# Patient Record
Sex: FEMALE | Race: WHITE | HISPANIC OR LATINO | Employment: FULL TIME | URBAN - METROPOLITAN AREA
[De-identification: names, ages, dates, MRNs, and addresses within clinical notes are randomized per-mention and may not be internally consistent; named-entity substitution may affect disease eponyms.]

---

## 2017-11-07 ENCOUNTER — HOSPITAL ENCOUNTER (EMERGENCY)
Facility: MEDICAL CENTER | Age: 37
End: 2017-11-07

## 2017-11-07 VITALS
WEIGHT: 156.31 LBS | RESPIRATION RATE: 18 BRPM | SYSTOLIC BLOOD PRESSURE: 120 MMHG | BODY MASS INDEX: 26.04 KG/M2 | TEMPERATURE: 97.8 F | DIASTOLIC BLOOD PRESSURE: 64 MMHG | HEIGHT: 65 IN | HEART RATE: 97 BPM

## 2017-11-07 PROCEDURE — 302449 STATCHG TRIAGE ONLY (STATISTIC)

## 2017-11-07 ASSESSMENT — PAIN SCALES - GENERAL: PAINLEVEL_OUTOF10: 0

## 2017-11-08 NOTE — ED NOTES
"Chief Complaint   Patient presents with   • Weakness     Patient states \"I have to get a blood transfusion every 3 months. They dont know what's wrong with me. I feel cold, tired, and weak and I think I need another transfusion.\"         "

## 2018-02-27 PROBLEM — J45.20 MILD INTERMITTENT ASTHMA WITHOUT COMPLICATION: Status: ACTIVE | Noted: 2018-02-27

## 2018-02-28 PROBLEM — Z87.19 HISTORY OF GI BLEED: Status: ACTIVE | Noted: 2018-02-28

## 2018-02-28 PROBLEM — D64.9 CHRONIC ANEMIA: Status: ACTIVE | Noted: 2018-02-28

## 2018-02-28 PROBLEM — F17.200 CURRENT SMOKER: Status: ACTIVE | Noted: 2018-02-28

## 2018-02-28 PROBLEM — D25.9 UTERINE LEIOMYOMA: Status: ACTIVE | Noted: 2018-02-28

## 2018-02-28 PROBLEM — Z87.19 HISTORY OF GASTRITIS: Status: ACTIVE | Noted: 2018-02-28

## 2018-03-02 PROBLEM — E55.9 VITAMIN D DEFICIENCY: Status: ACTIVE | Noted: 2018-03-02

## 2018-03-05 ENCOUNTER — OFFICE VISIT (OUTPATIENT)
Dept: HEMATOLOGY ONCOLOGY | Facility: MEDICAL CENTER | Age: 38
End: 2018-03-05
Payer: MEDICAID

## 2018-03-05 ENCOUNTER — HOSPITAL ENCOUNTER (OUTPATIENT)
Facility: MEDICAL CENTER | Age: 38
End: 2018-03-05
Attending: INTERNAL MEDICINE
Payer: MEDICAID

## 2018-03-05 VITALS
SYSTOLIC BLOOD PRESSURE: 118 MMHG | RESPIRATION RATE: 16 BRPM | DIASTOLIC BLOOD PRESSURE: 68 MMHG | BODY MASS INDEX: 26.38 KG/M2 | HEART RATE: 77 BPM | TEMPERATURE: 98.3 F | OXYGEN SATURATION: 99 % | WEIGHT: 158.51 LBS

## 2018-03-05 DIAGNOSIS — D64.9 CHRONIC ANEMIA: Primary | ICD-10-CM

## 2018-03-05 DIAGNOSIS — D64.9 CHRONIC ANEMIA: ICD-10-CM

## 2018-03-05 LAB
ANISOCYTOSIS BLD QL SMEAR: NORMAL
BASOPHILS # BLD AUTO: 0 % (ref 0–1.8)
EOSINOPHIL # BLD AUTO: 0.25 K/UL (ref 0–0.51)
EOSINOPHIL NFR BLD: 5 % (ref 0–6.9)
ERYTHROCYTE [DISTWIDTH] IN BLOOD BY AUTOMATED COUNT: 44.7 FL (ref 35.9–50)
FERRITIN SERPL-MCNC: 2 NG/ML (ref 10–291)
FOLATE SERPL-MCNC: 13.3 NG/ML
HCT VFR BLD AUTO: 26.4 % (ref 37–47)
HGB BLD-MCNC: 7.2 G/DL (ref 12–16)
HGB RETIC QN AUTO: 16.3 PG/CELL (ref 29–35)
HYPOCHROMIA BLD QL SMEAR: NORMAL
IMM RETICS NFR: 13.5 % (ref 9.3–17.4)
IRON SATN MFR SERPL: 2 % (ref 15–55)
IRON SERPL-MCNC: 11 UG/DL (ref 40–170)
LYMPHOCYTES # BLD AUTO: 1.45 K/UL (ref 1–4.8)
LYMPHOCYTES NFR BLD: 29 % (ref 22–41)
MANUAL DIFF BLD: NORMAL
MCH RBC QN AUTO: 18 PG (ref 27–33)
MCHC RBC AUTO-ENTMCNC: 27.3 G/DL (ref 33.6–35)
MCV RBC AUTO: 66 FL (ref 81.4–97.8)
MICROCYTES BLD QL SMEAR: NORMAL
MONOCYTES # BLD AUTO: 0.25 K/UL (ref 0–0.85)
MONOCYTES NFR BLD AUTO: 5 % (ref 0–13.4)
NEUTROPHILS # BLD AUTO: 3.05 K/UL (ref 2–7.15)
NEUTROPHILS NFR BLD: 61 % (ref 44–72)
OVALOCYTES BLD QL SMEAR: NORMAL
PLATELET # BLD AUTO: 380 K/UL (ref 164–446)
PLATELET BLD QL SMEAR: ADEQUATE
PMV BLD AUTO: 11 FL (ref 9–12.9)
RBC # BLD AUTO: 4 M/UL (ref 4.2–5.4)
RBC BLD AUTO: NORMAL
RETICS # AUTO: 0.05 M/UL (ref 0.04–0.06)
RETICS/RBC NFR: 1.2 % (ref 0.8–2.1)
SCHISTOCYTES BLD QL SMEAR: NORMAL
TIBC SERPL-MCNC: 540 UG/DL (ref 250–450)
VIT B12 SERPL-MCNC: 378 PG/ML (ref 211–911)
WBC # BLD AUTO: 5 K/UL (ref 4.8–10.8)

## 2018-03-05 PROCEDURE — 82746 ASSAY OF FOLIC ACID SERUM: CPT

## 2018-03-05 PROCEDURE — 85027 COMPLETE CBC AUTOMATED: CPT

## 2018-03-05 PROCEDURE — 99205 OFFICE O/P NEW HI 60 MIN: CPT | Performed by: INTERNAL MEDICINE

## 2018-03-05 PROCEDURE — 85046 RETICYTE/HGB CONCENTRATE: CPT

## 2018-03-05 PROCEDURE — 83550 IRON BINDING TEST: CPT

## 2018-03-05 PROCEDURE — 83540 ASSAY OF IRON: CPT

## 2018-03-05 PROCEDURE — 36415 COLL VENOUS BLD VENIPUNCTURE: CPT

## 2018-03-05 PROCEDURE — 82728 ASSAY OF FERRITIN: CPT

## 2018-03-05 PROCEDURE — 82607 VITAMIN B-12: CPT

## 2018-03-05 PROCEDURE — 85007 BL SMEAR W/DIFF WBC COUNT: CPT

## 2018-03-05 ASSESSMENT — PAIN SCALES - GENERAL: PAINLEVEL: NO PAIN

## 2018-03-05 NOTE — PROGRESS NOTES
Consult Note: Hematology    Date of consultation: 3/5/2018     Referring provider: Promise Allison P.A.-C.    Reason for consultation:   CC: Iron deficiency anemia    History of presenting illness:   -March 24, 2014 normal colonoscopy  -March 21, 2014 normal EGD  -November 10, 2017.CT scan of the abdomen and pelvis shows bulky uterine enlargement with large uterine leiomyomata, measuring about 12.5 x 8.8 cm at about 11 cm transversely  -First seen in on 3/5/2018  Maye Mott  is a 37 y.o. year old female who is seen in clinic today for evaluation of iron deficient anemia, she is accompanied by her wife and mother who provided no other history. Patient states she has had anemia for at least 20 years but has never received IV iron. She has been on oral iron supplement which she is not tolerating well. She has needed multiple blood transfusions throughout her life as she did not have insurance and she would present to the emergency room with symptomatic anemia and repeat receive a blood transfusion.  Currently patient states she is not symptomatic she is able to walk and work without restriction.  She has been evaluated by gastroenterology in the past and had endoscopy in 2012 in 2014 both of which were negative for any source of bleeding. Patient states he does have fibroids for which she is being scheduled to be seen by an ObGyn she describes her periods lasting about 7 days and extremely heavy for the first couple of days      Anemia  Location, blood  Severity, severe  Timing, constant  Modifying factors, oral iron  Quality, microcytic  Duration, chronic  Context, discovered on routine labs  Associated signs and symptoms, fatigue    Review of Systems:  Constitutional: No fever, chills, weight loss ,malaise/fatigue.      All other review of systems are negative except what was mentioned above in the HPI.    Past Medical History:    Past Medical History:   Diagnosis Date   • Asthma    • Enlarged heart    •  Head ache    • Patient denies medical problems        Past surgical history:  No past surgical history on file.    Allergies:  Iron    Medications:    Current Outpatient Prescriptions   Medication Sig Dispense Refill   • Cholecalciferol (VITAMIN D3) 36211 units Tab Take 1 Tab by mouth 2X A WEEK. For 3 months. 9 Tab 2   • IRON PO Take  by mouth.     • albuterol 108 (90 Base) MCG/ACT Aero Soln inhalation aerosol Inhale 2 Puffs by mouth every 6 hours as needed for Shortness of Breath. 8.5 g 2     No current facility-administered medications for this visit.        Social History:     Social History     Social History   • Marital status: Single     Spouse name: N/A   • Number of children: N/A   • Years of education: N/A     Occupational History   • Not on file.     Social History Main Topics   • Smoking status: Current Every Day Smoker   • Smokeless tobacco: Never Used   • Alcohol use No      Comment: Former   • Drug use: No   • Sexual activity: Yes     Other Topics Concern   • Not on file     Social History Narrative   • No narrative on file       Family History:     Family History   Problem Relation Age of Onset   • Arthritis Mother    • Stroke Mother    • Other Mother      Brain tumor   • Hypertension Father        Physical Exam:  Vitals:   /68   Pulse 77   Temp 36.8 °C (98.3 °F)   Resp 16   Wt 71.9 kg (158 lb 8.2 oz)   LMP 02/18/2018   SpO2 99%   Breastfeeding? No   BMI 26.38 kg/m²     General: Not in acute distress, alert and oriented x 3  HEENT: No pallor, icterus. Oropharynx clear.   Neck: Supple, no palpable masses.  Lymph nodes: No palpable cervical, supraclavicular, axillary or inguinal lymphadenopathy.    CVS: regular rate and rhythm, no rubs or gallops  RESP: Clear to auscultate bilaterally, no wheezing or crackles.   ABD: Soft, non tender, non distended, positive bowel sounds, no palpable organomegaly  EXT: No edema or cyanosis  CNS: Alert and oriented x3, No focal deficits.  Skin- No  rash      Labs:       Imaging:  November 10, 2017.  CT scan of the abdomen and pelvis shows bulky uterine enlargement with large uterine leiomyomata, measuring about 12.5 x 8.8 cm at about 11 cm transversely    Assessment and Plan:  -Anemia  -Chronic uncontrolled  -Labs reviewed above  -Imaging scan from November 10, 2017 reviewed with the patient  -We will do a workup with serum iron, ferritin B12 and folate  -Arrange for IV iron as she is not tolerating oral iron  -No need for blood transfusion at this time.  -Follow-up after iron transfusion  -Old records summarized above    -Iron deficiency  -Chronic uncontrolled  -Secondary to fibroids. Advised to follow up with ObGyn as soon as possible  -GI workup has been negative in the past        She agreed and verbalized her agreement and understanding with the current plan.  I answered all questions and concerns she has at this time.  Dear  Promise Allison P.A.-C.,  Thank you for allowing me to participate in her care.    All labs and/or imaging studies mentioned in the note above were reviewed with and explained to the patient as a pertain to medical decision making.    Please note that this dictation was created using voice recognition software. I have made every reasonable attempt to correct obvious errors, but I expect that there are errors of grammar and possibly content that I did not discover before finalizing the note.      SIGNATURES:  Evelia Witt    CC:  ZHANNA Garcia Carrie A, P.A.-C. Kira Brooks M.D. Brooks, Kira M.D.

## 2018-03-20 ENCOUNTER — OUTPATIENT INFUSION SERVICES (OUTPATIENT)
Dept: ONCOLOGY | Facility: MEDICAL CENTER | Age: 38
End: 2018-03-20
Attending: INTERNAL MEDICINE
Payer: MEDICAID

## 2018-03-20 ENCOUNTER — TELEPHONE (OUTPATIENT)
Dept: HEMATOLOGY ONCOLOGY | Facility: MEDICAL CENTER | Age: 38
End: 2018-03-20

## 2018-03-20 VITALS
WEIGHT: 161.38 LBS | HEART RATE: 66 BPM | TEMPERATURE: 98.6 F | OXYGEN SATURATION: 100 % | HEIGHT: 65 IN | RESPIRATION RATE: 18 BRPM | BODY MASS INDEX: 26.89 KG/M2 | DIASTOLIC BLOOD PRESSURE: 50 MMHG | SYSTOLIC BLOOD PRESSURE: 115 MMHG

## 2018-03-20 DIAGNOSIS — D64.9 ANEMIA, UNSPECIFIED TYPE: ICD-10-CM

## 2018-03-20 LAB
BASOPHILS # BLD AUTO: 1.4 % (ref 0–1.8)
BASOPHILS # BLD: 0.1 K/UL (ref 0–0.12)
EOSINOPHIL # BLD AUTO: 0.49 K/UL (ref 0–0.51)
EOSINOPHIL NFR BLD: 6.9 % (ref 0–6.9)
ERYTHROCYTE [DISTWIDTH] IN BLOOD BY AUTOMATED COUNT: 46.2 FL (ref 35.9–50)
HCT VFR BLD AUTO: 28.5 % (ref 37–47)
HGB BLD-MCNC: 7.6 G/DL (ref 12–16)
IMM GRANULOCYTES # BLD AUTO: 0.02 K/UL (ref 0–0.11)
IMM GRANULOCYTES NFR BLD AUTO: 0.3 % (ref 0–0.9)
LYMPHOCYTES # BLD AUTO: 1.71 K/UL (ref 1–4.8)
LYMPHOCYTES NFR BLD: 23.9 % (ref 22–41)
MCH RBC QN AUTO: 17.8 PG (ref 27–33)
MCHC RBC AUTO-ENTMCNC: 26.7 G/DL (ref 33.6–35)
MCV RBC AUTO: 66.6 FL (ref 81.4–97.8)
MONOCYTES # BLD AUTO: 0.45 K/UL (ref 0–0.85)
MONOCYTES NFR BLD AUTO: 6.3 % (ref 0–13.4)
NEUTROPHILS # BLD AUTO: 4.38 K/UL (ref 2–7.15)
NEUTROPHILS NFR BLD: 61.2 % (ref 44–72)
NRBC # BLD AUTO: 0 K/UL
NRBC BLD-RTO: 0 /100 WBC
PLATELET # BLD AUTO: 337 K/UL (ref 164–446)
RBC # BLD AUTO: 4.28 M/UL (ref 4.2–5.4)
WBC # BLD AUTO: 7.2 K/UL (ref 4.8–10.8)

## 2018-03-20 PROCEDURE — 96361 HYDRATE IV INFUSION ADD-ON: CPT

## 2018-03-20 PROCEDURE — A9270 NON-COVERED ITEM OR SERVICE: HCPCS | Performed by: INTERNAL MEDICINE

## 2018-03-20 PROCEDURE — 700111 HCHG RX REV CODE 636 W/ 250 OVERRIDE (IP): Performed by: INTERNAL MEDICINE

## 2018-03-20 PROCEDURE — 96372 THER/PROPH/DIAG INJ SC/IM: CPT | Mod: XU

## 2018-03-20 PROCEDURE — 700102 HCHG RX REV CODE 250 W/ 637 OVERRIDE(OP): Performed by: INTERNAL MEDICINE

## 2018-03-20 PROCEDURE — 700105 HCHG RX REV CODE 258: Performed by: NURSE PRACTITIONER

## 2018-03-20 PROCEDURE — 96365 THER/PROPH/DIAG IV INF INIT: CPT

## 2018-03-20 PROCEDURE — 85025 COMPLETE CBC W/AUTO DIFF WBC: CPT

## 2018-03-20 PROCEDURE — 700105 HCHG RX REV CODE 258: Performed by: INTERNAL MEDICINE

## 2018-03-20 RX ORDER — CYANOCOBALAMIN 1000 UG/ML
1000 INJECTION, SOLUTION INTRAMUSCULAR; SUBCUTANEOUS ONCE
Status: COMPLETED | OUTPATIENT
Start: 2018-03-20 | End: 2018-03-20

## 2018-03-20 RX ORDER — SODIUM CHLORIDE 9 MG/ML
1000 INJECTION, SOLUTION INTRAVENOUS ONCE
Status: COMPLETED | OUTPATIENT
Start: 2018-03-20 | End: 2018-03-20

## 2018-03-20 RX ORDER — ACETAMINOPHEN 325 MG/1
650 TABLET ORAL ONCE
Status: COMPLETED | OUTPATIENT
Start: 2018-03-20 | End: 2018-03-20

## 2018-03-20 RX ORDER — DIPHENHYDRAMINE HCL 25 MG
25 TABLET ORAL ONCE
Status: COMPLETED | OUTPATIENT
Start: 2018-03-20 | End: 2018-03-20

## 2018-03-20 RX ADMIN — CYANOCOBALAMIN 1000 MCG: 1000 INJECTION, SOLUTION INTRAMUSCULAR; SUBCUTANEOUS at 15:34

## 2018-03-20 RX ADMIN — SODIUM CHLORIDE 1000 ML: 9 INJECTION, SOLUTION INTRAVENOUS at 13:30

## 2018-03-20 RX ADMIN — IRON DEXTRAN 25 MG: 50 INJECTION INTRAMUSCULAR; INTRAVENOUS at 12:39

## 2018-03-20 RX ADMIN — DIPHENHYDRAMINE HCL 25 MG: 25 TABLET ORAL at 12:17

## 2018-03-20 RX ADMIN — ACETAMINOPHEN 650 MG: 325 TABLET, FILM COATED ORAL at 12:18

## 2018-03-20 ASSESSMENT — PAIN SCALES - GENERAL: PAINLEVEL: NO PAIN

## 2018-03-20 NOTE — TELEPHONE ENCOUNTER
Vickie from Infusion called to inform Dr. Witt the patient did not proceed with her scheduled Iron Dextran today and would like to receive venofer instead. Vickie is asking for an order for Venofer if Dr. Witt approves. I informed her Dr. Witt is in [place] today but I will relay the message.

## 2018-03-20 NOTE — PROGRESS NOTES
Patient presents for Iron Dextran infusion. Reviewed plan of care with patient and her wife, both verbalize understanding. IV started, blood drawn per protocol. Pre-medications and test dose administered.    1310 - Upon completion of test dose patient with chest pain and pre-rigors. Call placed to Yanique VICTORIA. Per Yanique the patient is to get hydration instead of the full dose of Iron Dextran. She will talk to Dr. Witt about how to proceed with iron infusions in the future.    Hydration completed with no new patient complaints. B-12 injection given as ordered. Informed patient that our schedulers will call her tomorrow when we receive the new orders to get her scheduled. Patient verbalizes understanding. IV discontinued, catheter intact, compression dressing to site. Patient released in no acute distress with her wife.

## 2018-03-20 NOTE — PROGRESS NOTES
IV Iron Per Pharmacy Note  Total Dose Iron not given. Patient reacted to test dose, See RN notes.     Patient Lean Body Weight:  57 kg  Reason for Iron Replacement: Iron Deficiency Anemia, inadequate response/intolerant of oral iron    Lab Results   Component Value Date/Time    WBC 7.2 03/20/2018 11:17 AM    RBC 4.28 03/20/2018 11:17 AM    HEMOGLOBIN 7.6 (L) 03/20/2018 11:17 AM    HEMATOCRIT 28.5 (L) 03/20/2018 11:17 AM    MCV 66.6 (L) 03/20/2018 11:17 AM    MCH 17.8 (L) 03/20/2018 11:17 AM    MCHC 26.7 (L) 03/20/2018 11:17 AM    MPV 11.0 03/05/2018 09:38 AM     3/5/18:  Fe = 11  TIBC = 540  Iron Sat = 2%  Ferritin = 2  Vitamin B12 = 378  Folic acid = 13    Assessment/Plan:  1. IV Iron Indicated.   2. Give Iron Dextran 25 mg IV test dose following diphenhydramine/acetaminophen premeds over 30 minutes per protocol.  3. If no reaction (Anaphylaxis, Hypotension/Hypertension, N/V/D, Chest pain/Back Pain, Urticaria/Pruritis) in the next hour, proceed to full dose.   4.  Charlie Melendrez, PharmD

## 2018-04-02 ENCOUNTER — TELEPHONE (OUTPATIENT)
Dept: HEMATOLOGY ONCOLOGY | Facility: MEDICAL CENTER | Age: 38
End: 2018-04-02

## 2018-04-02 ENCOUNTER — OUTPATIENT INFUSION SERVICES (OUTPATIENT)
Dept: ONCOLOGY | Facility: MEDICAL CENTER | Age: 38
End: 2018-04-02
Attending: INTERNAL MEDICINE
Payer: MEDICAID

## 2018-04-02 VITALS
HEART RATE: 75 BPM | BODY MASS INDEX: 26.23 KG/M2 | TEMPERATURE: 97.5 F | RESPIRATION RATE: 18 BRPM | DIASTOLIC BLOOD PRESSURE: 38 MMHG | OXYGEN SATURATION: 100 % | WEIGHT: 157.41 LBS | SYSTOLIC BLOOD PRESSURE: 115 MMHG | HEIGHT: 65 IN

## 2018-04-02 DIAGNOSIS — D64.9 CHRONIC ANEMIA: ICD-10-CM

## 2018-04-02 LAB
ABO GROUP BLD: NORMAL
ALBUMIN SERPL BCP-MCNC: 4.5 G/DL (ref 3.2–4.9)
ALBUMIN/GLOB SERPL: 1.9 G/DL
ALP SERPL-CCNC: 60 U/L (ref 30–99)
ALT SERPL-CCNC: 11 U/L (ref 2–50)
ANION GAP SERPL CALC-SCNC: 4 MMOL/L (ref 0–11.9)
ANISOCYTOSIS BLD QL SMEAR: ABNORMAL
APPEARANCE UR: ABNORMAL
AST SERPL-CCNC: 14 U/L (ref 12–45)
BACTERIA #/AREA URNS HPF: ABNORMAL /HPF
BASOPHILS # BLD AUTO: 1.8 % (ref 0–1.8)
BASOPHILS # BLD: 0.14 K/UL (ref 0–0.12)
BILIRUB SERPL-MCNC: 0.4 MG/DL (ref 0.1–1.5)
BILIRUB UR QL STRIP.AUTO: NEGATIVE
BLD GP AB INVEST PLASRBC-IMP: NORMAL
BLD GP AB SCN SERPL QL: NORMAL
BUN SERPL-MCNC: 11 MG/DL (ref 8–22)
CALCIUM SERPL-MCNC: 9.2 MG/DL (ref 8.5–10.5)
CHLORIDE SERPL-SCNC: 106 MMOL/L (ref 96–112)
CO2 SERPL-SCNC: 29 MMOL/L (ref 20–33)
COLOR UR: ABNORMAL
COMMENT 1642: NORMAL
CREAT SERPL-MCNC: 0.75 MG/DL (ref 0.5–1.4)
DACRYOCYTES BLD QL SMEAR: NORMAL
DAT C3D-SP REAG RBC QL: NORMAL
DAT IGG-SP REAG RBC QL: NORMAL
EOSINOPHIL # BLD AUTO: 0.46 K/UL (ref 0–0.51)
EOSINOPHIL NFR BLD: 6.1 % (ref 0–6.9)
EPI CELLS #/AREA URNS HPF: ABNORMAL /HPF
ERYTHROCYTE [DISTWIDTH] IN BLOOD BY AUTOMATED COUNT: 55.8 FL (ref 35.9–50)
GLOBULIN SER CALC-MCNC: 2.4 G/DL (ref 1.9–3.5)
GLUCOSE SERPL-MCNC: 69 MG/DL (ref 65–99)
GLUCOSE UR STRIP.AUTO-MCNC: NEGATIVE MG/DL
HCT VFR BLD AUTO: 32.8 % (ref 37–47)
HGB BLD-MCNC: 9.1 G/DL (ref 12–16)
HYALINE CASTS #/AREA URNS LPF: ABNORMAL /LPF
IMM GRANULOCYTES # BLD AUTO: 0.02 K/UL (ref 0–0.11)
IMM GRANULOCYTES NFR BLD AUTO: 0.3 % (ref 0–0.9)
KETONES UR STRIP.AUTO-MCNC: NEGATIVE MG/DL
LEUKOCYTE ESTERASE UR QL STRIP.AUTO: ABNORMAL
LYMPHOCYTES # BLD AUTO: 2.52 K/UL (ref 1–4.8)
LYMPHOCYTES NFR BLD: 33.2 % (ref 22–41)
MCH RBC QN AUTO: 19.5 PG (ref 27–33)
MCHC RBC AUTO-ENTMCNC: 27.7 G/DL (ref 33.6–35)
MCV RBC AUTO: 70.4 FL (ref 81.4–97.8)
MICRO URNS: ABNORMAL
MICROCYTES BLD QL SMEAR: ABNORMAL
MONOCYTES # BLD AUTO: 0.39 K/UL (ref 0–0.85)
MONOCYTES NFR BLD AUTO: 5.1 % (ref 0–13.4)
MORPHOLOGY BLD-IMP: NORMAL
NEUTROPHILS # BLD AUTO: 4.06 K/UL (ref 2–7.15)
NEUTROPHILS NFR BLD: 53.5 % (ref 44–72)
NITRITE UR QL STRIP.AUTO: NEGATIVE
NRBC # BLD AUTO: 0 K/UL
NRBC BLD-RTO: 0 /100 WBC
OVALOCYTES BLD QL SMEAR: NORMAL
PH UR STRIP.AUTO: 7 [PH]
PLATELET # BLD AUTO: 506 K/UL (ref 164–446)
PLATELET BLD QL SMEAR: NORMAL
PMV BLD AUTO: 10.7 FL (ref 9–12.9)
POIKILOCYTOSIS BLD QL SMEAR: NORMAL
POTASSIUM SERPL-SCNC: 3.8 MMOL/L (ref 3.6–5.5)
PROT SERPL-MCNC: 6.9 G/DL (ref 6–8.2)
PROT UR QL STRIP: 30 MG/DL
RBC # BLD AUTO: 4.66 M/UL (ref 4.2–5.4)
RBC # URNS HPF: >150 /HPF
RBC BLD AUTO: PRESENT
RBC UR QL AUTO: ABNORMAL
RH BLD: NORMAL
SODIUM SERPL-SCNC: 139 MMOL/L (ref 135–145)
SP GR UR STRIP.AUTO: 1.01
UROBILINOGEN UR STRIP.AUTO-MCNC: 0.2 MG/DL
WBC # BLD AUTO: 7.6 K/UL (ref 4.8–10.8)
WBC #/AREA URNS HPF: ABNORMAL /HPF

## 2018-04-02 PROCEDURE — 80053 COMPREHEN METABOLIC PANEL: CPT

## 2018-04-02 PROCEDURE — 85025 COMPLETE CBC W/AUTO DIFF WBC: CPT

## 2018-04-02 PROCEDURE — 306780 HCHG STAT FOR TRANSFUSION PER CASE

## 2018-04-02 PROCEDURE — 86901 BLOOD TYPING SEROLOGIC RH(D): CPT

## 2018-04-02 PROCEDURE — 700111 HCHG RX REV CODE 636 W/ 250 OVERRIDE (IP): Performed by: INTERNAL MEDICINE

## 2018-04-02 PROCEDURE — 86900 BLOOD TYPING SEROLOGIC ABO: CPT

## 2018-04-02 PROCEDURE — 86870 RBC ANTIBODY IDENTIFICATION: CPT

## 2018-04-02 PROCEDURE — 81001 URINALYSIS AUTO W/SCOPE: CPT

## 2018-04-02 PROCEDURE — 96374 THER/PROPH/DIAG INJ IV PUSH: CPT

## 2018-04-02 PROCEDURE — 86880 COOMBS TEST DIRECT: CPT | Mod: 91

## 2018-04-02 PROCEDURE — 36415 COLL VENOUS BLD VENIPUNCTURE: CPT

## 2018-04-02 PROCEDURE — 86850 RBC ANTIBODY SCREEN: CPT

## 2018-04-02 RX ADMIN — IRON SUCROSE 200 MG: 20 INJECTION, SOLUTION INTRAVENOUS at 17:44

## 2018-04-02 ASSESSMENT — PAIN SCALES - GENERAL: PAINLEVEL: NO PAIN

## 2018-04-02 NOTE — LETTER
April 2, 2018         Patient: Maye Mott   YOB: 1980   Date of Visit: 4/2/2018           To Whom it May Concern:    Maye Mott was seen in my clinic on 4/2/2018.   If you have any questions or concerns, please don't hesitate to call.        Sincerely,           Alejandra Denise RN  Electronically Signed

## 2018-04-03 NOTE — PROGRESS NOTES
Patient arrived to clinic for #1/5 Venofer.  Patient reported she went to [facility] for a blood transfusion about a week ago.  She states she has had flank pain, blood in urine, and night sweat/chills since the transfusion.  SHAKIR Cahnel notified and CBC, CMP, UA ordered with concern for possible transfusion reaction.   PIV established with good blood return and labs sent.  Reviewed lab results with Yanique.  Per Yanique, transfusion reaction ruled out and concerned for possible kidney stones. She stated to have the patient drink plenty of fluids and if symptoms persist to go see her PCP.  OK given to administer Venofer today.  Informed patient and spouse who verbalized understanding.  Venofer administered IV push over 5 minutes, pt tolerated well.  Monitored for 30 minutes post medication, denied any discomfort.  Confirmed next appointment and pt ambulated out of clinic in no apparent distress.

## 2018-04-03 NOTE — TELEPHONE ENCOUNTER
Infusion RN phone to update regarding patient's symptoms. She is noting pink tinged urine and onset of back pain since transfusion of RBC last week at [facility]: no c/o dysuria, burning, urgency, or frequency. She reports night sweats, possible low-grade fever although she is unable to quantify.    Patient is scheduled to receive Venofer: CBC, CMP, UA completed as well as indirect/direct INDRA given recent transfusion and onset of hematuria and low back pain. CBC and CMP were evaluated and found to be within acceptable limits. UA is noted to have a large amount of occult hematuria, INDRA negative although antibody screen is positive - blood bank will workup accordingly and notified of any concerns.    Patient proceeded with Venofer as scheduled.  She is advised to increase hydration and proceed for further evaluation to PCP for evaluation/consideration of possible kidney stones should her symptoms persist.

## 2018-04-05 ENCOUNTER — OUTPATIENT INFUSION SERVICES (OUTPATIENT)
Dept: ONCOLOGY | Facility: MEDICAL CENTER | Age: 38
End: 2018-04-05
Attending: INTERNAL MEDICINE
Payer: MEDICAID

## 2018-04-05 VITALS
HEIGHT: 65 IN | TEMPERATURE: 97.6 F | WEIGHT: 156.31 LBS | BODY MASS INDEX: 26.04 KG/M2 | DIASTOLIC BLOOD PRESSURE: 45 MMHG | RESPIRATION RATE: 18 BRPM | SYSTOLIC BLOOD PRESSURE: 112 MMHG | OXYGEN SATURATION: 100 % | HEART RATE: 78 BPM

## 2018-04-05 PROCEDURE — 306780 HCHG STAT FOR TRANSFUSION PER CASE

## 2018-04-05 PROCEDURE — 96374 THER/PROPH/DIAG INJ IV PUSH: CPT

## 2018-04-05 PROCEDURE — 700111 HCHG RX REV CODE 636 W/ 250 OVERRIDE (IP): Performed by: INTERNAL MEDICINE

## 2018-04-05 RX ADMIN — IRON SUCROSE 200 MG: 20 INJECTION, SOLUTION INTRAVENOUS at 16:43

## 2018-04-05 ASSESSMENT — PAIN SCALES - GENERAL: PAINLEVEL: NO PAIN

## 2018-04-06 NOTE — PROGRESS NOTES
Patient arrived for Day 2 of 5 Venofer infusion.  Reports no significant concerns with first Venofer infusion.  Pt appears anxious, reports she still feels low back pain with certain positions while working, and still has blood in her urine. She reports this is worse at night.  States she feels sweaty at night, but unsure if fever.  Pt reports attempting to make appt with PCP, has not gotten thru at this time. PIV started, venofer push given over 5 mins.  Pt tolerated well.  PIV flushed for 30mins; post observation completed without incident.  Confirmed next appt on Saturday.  Encouraged patient to continue monitoring for worsening symptoms and f/u with PCP and/or ER if emergent.  Pt agreeable.  Discharged home with wife in no apparent distress.

## 2018-04-07 ENCOUNTER — OUTPATIENT INFUSION SERVICES (OUTPATIENT)
Dept: ONCOLOGY | Facility: MEDICAL CENTER | Age: 38
End: 2018-04-07
Attending: INTERNAL MEDICINE
Payer: MEDICAID

## 2018-04-07 VITALS
BODY MASS INDEX: 25.64 KG/M2 | DIASTOLIC BLOOD PRESSURE: 83 MMHG | RESPIRATION RATE: 18 BRPM | HEART RATE: 72 BPM | SYSTOLIC BLOOD PRESSURE: 97 MMHG | HEIGHT: 65 IN | TEMPERATURE: 98.6 F | OXYGEN SATURATION: 98 % | WEIGHT: 153.88 LBS

## 2018-04-07 PROCEDURE — 306780 HCHG STAT FOR TRANSFUSION PER CASE

## 2018-04-07 ASSESSMENT — PAIN SCALES - GENERAL: PAINLEVEL: NO PAIN

## 2018-04-08 NOTE — PROGRESS NOTES
"Pt arrived for day 3/5 of Venofer for Anemia. Pt told nurse that she developed a rash, and what looked like hives on her left arm yesterday. Pt said that it could have been working with different \"spices\" at work. Pt also reported that she felt SOB after getting her Venofer on Thursday. RN explained to Pt that this could have been a reaction to the Venofer. Pt's doctor paged. RN explained Pt's symptoms to Dr. Witt, and that Pt has a history of being allergic to Iron Dextran. Dr. Witt told nurse that he did not want Pt to receive medication today, and to follow-up with him this next week. RN explained POC to Pt, who was receptive to education. RN also encouraged Pt to follow-up with the ER or urgent care for the blood in her urine that she has been experiencing for the last 2 weeks (her PCP advised that she follow-up with the ED yesterday, but she didn't because she didn't want it to interfere with her Iron infusion). Pt left department appearing in stable condition.  "

## 2018-04-09 ENCOUNTER — APPOINTMENT (OUTPATIENT)
Dept: ONCOLOGY | Facility: MEDICAL CENTER | Age: 38
End: 2018-04-09
Attending: INTERNAL MEDICINE
Payer: MEDICAID

## 2018-04-11 ENCOUNTER — APPOINTMENT (OUTPATIENT)
Dept: ONCOLOGY | Facility: MEDICAL CENTER | Age: 38
End: 2018-04-11
Attending: INTERNAL MEDICINE
Payer: MEDICAID

## 2018-04-12 ENCOUNTER — HOSPITAL ENCOUNTER (OUTPATIENT)
Facility: MEDICAL CENTER | Age: 38
End: 2018-04-12
Attending: INTERNAL MEDICINE
Payer: MEDICAID

## 2018-04-12 ENCOUNTER — OFFICE VISIT (OUTPATIENT)
Dept: HEMATOLOGY ONCOLOGY | Facility: MEDICAL CENTER | Age: 38
End: 2018-04-12
Payer: MEDICAID

## 2018-04-12 ENCOUNTER — NON-PROVIDER VISIT (OUTPATIENT)
Dept: HEMATOLOGY ONCOLOGY | Facility: MEDICAL CENTER | Age: 38
End: 2018-04-12
Payer: MEDICAID

## 2018-04-12 VITALS
OXYGEN SATURATION: 100 % | HEART RATE: 67 BPM | RESPIRATION RATE: 16 BRPM | WEIGHT: 155.09 LBS | DIASTOLIC BLOOD PRESSURE: 74 MMHG | HEIGHT: 66 IN | BODY MASS INDEX: 24.93 KG/M2 | SYSTOLIC BLOOD PRESSURE: 112 MMHG | TEMPERATURE: 98.1 F

## 2018-04-12 DIAGNOSIS — D64.9 CHRONIC ANEMIA: ICD-10-CM

## 2018-04-12 LAB
ERYTHROCYTE [DISTWIDTH] IN BLOOD BY AUTOMATED COUNT: 68 FL (ref 35.9–50)
HCT VFR BLD AUTO: 38.1 % (ref 37–47)
HGB BLD-MCNC: 10.9 G/DL (ref 12–16)
MCH RBC QN AUTO: 21 PG (ref 27–33)
MCHC RBC AUTO-ENTMCNC: 28.6 G/DL (ref 33.6–35)
MCV RBC AUTO: 73.6 FL (ref 81.4–97.8)
PLATELET # BLD AUTO: 405 K/UL (ref 164–446)
RBC # BLD AUTO: 5.18 M/UL (ref 4.2–5.4)
WBC # BLD AUTO: 6.2 K/UL (ref 4.8–10.8)

## 2018-04-12 PROCEDURE — 85027 COMPLETE CBC AUTOMATED: CPT

## 2018-04-12 PROCEDURE — 36415 COLL VENOUS BLD VENIPUNCTURE: CPT | Performed by: INTERNAL MEDICINE

## 2018-04-12 PROCEDURE — 99213 OFFICE O/P EST LOW 20 MIN: CPT | Performed by: INTERNAL MEDICINE

## 2018-04-12 ASSESSMENT — PAIN SCALES - GENERAL: PAINLEVEL: 6=MODERATE PAIN

## 2018-04-12 NOTE — PROGRESS NOTES
Maye Mott is a 37 y.o. female here for a non-provider visit for: Lab Draws  on 4/12/2018 at 10:10 AM    Procedure Performed: Venipuncture     Anatomical site: Right Antecubital Area (AC)    Equipment used: 21g    Labs drawn: CBC w/diff    Ordering Provider: Dr. LUIS E Witt    Navin By: Og Friedman Ass't    Successful draw

## 2018-04-12 NOTE — PROGRESS NOTES
Consult Note: Hematology    Date of consultation: 4/12/2018     Referring provider: Promise Allison P.A.-C.    Reason for consultation:   CC: Iron deficiency anemia    History of presenting illness:   -March 24, 2014 normal colonoscopy  -March 21, 2014 normal EGD  -November 10, 2017.CT scan of the abdomen and pelvis shows bulky uterine enlargement with large uterine leiomyomata, measuring about 12.5 x 8.8 cm at about 11 cm transversely  -March 20, 2018. Patient presented for IV iron dextran. She had chest pain and right-sided with test dose  -April 2, 2018. Patient received 1st dose of Venofer  -April 5, 2018 patient received 2nd dose of Venofer  -April 7, 2018. Patient presented for the 3rd dose of Venofer. She revealed she has some shortness of breath, hives and rash after the 2nd dose after she went home. Venofer discontinued      -First seen in on 3/5/2018  Maye Mott  is a 37 y.o. year old female who is seen in clinic today for evaluation of iron deficient anemia, she is accompanied by her wife and mother who provided no other history. Patient states she has had anemia for at least 20 years but has never received IV iron. She has been on oral iron supplement which she is not tolerating well. She has needed multiple blood transfusions throughout her life as she did not have insurance and she would present to the emergency room with symptomatic anemia and repeat receive a blood transfusion.  Currently patient states she is not symptomatic she is able to walk and work without restriction.  She has been evaluated by gastroenterology in the past and had endoscopy in 2012 in 2014 both of which were negative for any source of bleeding. Patient states he does have fibroids for which she is being scheduled to be seen by an ObGyn she describes her periods lasting about 7 days and extremely heavy for the first couple of days      Interval History:  Maye Mott is a 37 y.o. female who is seen in  clinic today for evaluation of iron deficiency anemia. Patient states she is unable to take oral iron at all. She is going to see ObGyn today for evaluation of her uterine mass. She is scheduled for surgery in June      Anemia  Location, blood  Severity, severe  Timing, constant  Modifying factors, oral iron  Quality, microcytic  Duration, chronic  Context, discovered on routine labs  Associated signs and symptoms, fatigue    Review of Systems:  Constitutional: No fever, chills, weight loss ,malaise/fatigue.      All other review of systems are negative except what was mentioned above in the HPI.    Past Medical History:    Past Medical History:   Diagnosis Date   • Asthma    • Enlarged heart    • Head ache    • Patient denies medical problems        Past surgical history:  History reviewed. No pertinent surgical history.    Allergies:  Iron    Medications:    Current Outpatient Prescriptions   Medication Sig Dispense Refill   • albuterol 108 (90 Base) MCG/ACT Aero Soln inhalation aerosol Inhale 2 Puffs by mouth every 6 hours as needed for Shortness of Breath. 8.5 g 2   • Cholecalciferol (VITAMIN D3) 20863 units Tab Take 1 Tab by mouth 2X A WEEK. For 3 months. 9 Tab 2     No current facility-administered medications for this visit.        Social History:     Social History     Social History   • Marital status: Single     Spouse name: N/A   • Number of children: N/A   • Years of education: N/A     Occupational History   • Not on file.     Social History Main Topics   • Smoking status: Current Every Day Smoker   • Smokeless tobacco: Never Used   • Alcohol use No      Comment: Former   • Drug use: No   • Sexual activity: Yes     Partners: Female     Other Topics Concern   • Not on file     Social History Narrative   • No narrative on file       Family History:     Family History   Problem Relation Age of Onset   • Arthritis Mother    • Stroke Mother    • Other Mother      Brain tumor   • Hypertension Father   "      Physical Exam:  Vitals:   /74   Pulse 67   Temp 36.7 °C (98.1 °F)   Resp 16   Ht 1.676 m (5' 6\")   Wt 70.4 kg (155 lb 1.5 oz)   SpO2 100%   BMI 25.03 kg/m²     General: Not in acute distress, alert and oriented x 3  HEENT: No pallor, icterus. Oropharynx clear.   Neck: Supple, no palpable masses.  Lymph nodes: No palpable cervical, supraclavicular, axillary or inguinal lymphadenopathy.    CVS: regular rate and rhythm, no rubs or gallops  RESP: Clear to auscultate bilaterally, no wheezing or crackles.   ABD: Soft, non tender, non distended, positive bowel sounds, no palpable organomegaly  EXT: No edema or cyanosis  CNS: Alert and oriented x3, No focal deficits.  Skin- No rash      Labs:   Results for TAYLOR HARVEY (MRN [id number]) as of 4/12/2018 11:35   3/20/2018 11:17 4/2/2018 14:35   WBC 7.2 7.6   RBC 4.28 4.66   Hemoglobin 7.6 (L) 9.1 (L)   Hematocrit 28.5 (L) 32.8 (L)   MCV 66.6 (L) 70.4 (L)   MCH 17.8 (L) 19.5 (L)   MCHC 26.7 (L) 27.7 (L)   RDW 46.2 55.8 (H)   Platelet Count 337 506 (H)   MPV  10.7   Neutrophils-Polys 61.20 53.50   Neutrophils (Absolute) 4.38 4.06   Lymphocytes 23.90 33.20   Lymphs (Absolute) 1.71 2.52   Monocytes 6.30 5.10   Monos (Absolute) 0.45 0.39   Eosinophils 6.90 6.10   Eos (Absolute) 0.49 0.46   Basophils 1.40 1.80   Baso (Absolute) 0.10 0.14 (H)   Immature Granulocytes 0.30 0.30   Immature Granulocytes (abs) 0.02 0.02   Nucleated RBC 0.00 0.00   NRBC (Absolute) 0.00 0.00   Plt Estimation  Increased   RBC Morphology  Present   Anisocytosis  2+   Microcytosis  2+   Poikilocytosis  2+   Ovalocytes  2+   Tear Drop Cells  1+   Comments-Diff  see below   Peripheral Smear Review  see below     Imaging:  November 10, 2017.  CT scan of the abdomen and pelvis shows bulky uterine enlargement with large uterine leiomyomata, measuring about 12.5 x 8.8 cm at about 11 cm transversely    Assessment and Plan:  -Anemia  -Chronic uncontrolled   4/12/2018   -Patient received 2 out " of 5 planned doses of Venofer  -Unable to tolerate this dose of iron dextran  -Recommended patient take iron pills every other day to help with side effects  -We'll transfuse as needed  -Check CBC today  -Proceed with surgery as planned  -I discussed with pharmacy possibility of giving the patient Injectafer however since the patient is on Medicaid that will not be covered. Patient is currently looking for a new job and when her insurance changes. Revisit the issue if needed.    -Iron deficiency  -Chronic uncontrolled  -Unable to tolerate IV  -Encouraged patient to take oral iron every other day to help with side effects  -Proceed with surgery as soon as possible    She agreed and verbalized her agreement and understanding with the current plan.  I answered all questions and concerns she has at this time.  Dear  Promise Allison P.A.-C.,  Thank you for allowing me to participate in her care.    All labs and/or imaging studies mentioned in the note above were reviewed with and explained to the patient as a pertain to medical decision making.    Please note that this dictation was created using voice recognition software. I have made every reasonable attempt to correct obvious errors, but I expect that there are errors of grammar and possibly content that I did not discover before finalizing the note.      SIGNATURES:  Evelia Witt    CC:  ZHANNA Garcia Carrie A, P.A.-C. Kira Brooks M.D. Brooks, Kira M.D.

## 2018-05-16 NOTE — H&P
DATE OF SCHEDULED SURGERY:  2018    REASON FOR SURGERY:  Symptomatic uterine fibroids, associated   menometrorrhagia, dysmenorrhea, pelvic pain, and symptomatic anemia.    HISTORY OF PRESENT ILLNESS:  This is a 38-year-old woman,  0 negative   urine pregnancy test on 2018, who states that she has exhausted all   conservative management, has failed multiple hormonal regimens.  She has had   Depo-Lupron therapy with minimal shrinkage of the uterine fibroids and states   that she is now interested in proceeding forward with attempted definitive   surgical correction with the surgery as described above.  She is having   significant pelvic pain and pressure and states that she has exhausted all   conservative measures and is now interested in proceeding forward with   laparoscopic-assisted vaginal hysterectomy, bilateral salpingectomy.  She is   interested in proceeding forward with ovarian conservation after counseling.    Risks, benefits, and alternatives of these procedures were addressed with the   patient.  She has asked appropriate questions, signed the appropriate   consents, and wished to proceed forward with surgery as planned.  Of note, the   patient has had a pelvic ultrasound demonstrating enlarged uterus with   several uterine fibroids, largest of which measures 6.6 cm of the fundal   aspect and a 4.9 cm posterior aspect.  She also has a superior fibroid of 3.9   cm.  She does understand the small possibility of an open procedure, although   this would be less likely.  She states that she has exhausted conservative   measures, has been given Depo-Lupron in an attempt to shrink the uterus and is   now interested in proceeding forward with surgery as described above.  Risks,   benefits and alternatives have been addressed.  She has asked appropriate   questions including the possibility of an open procedure and wishes to proceed   forward with surgery as planned.  She has undergone an  endometrial biopsy,   which showed no evidence of hyperplasia or malignancy.  Her Pap smear was   within normal limits and she had negative cervical cultures.    PAST MEDICAL HISTORY:  Gastroesophageal reflux disease, migraine headaches,   anemia, lumbago, hernia, varicosities, asthma.    PAST SURGICAL HISTORY:  She has had eye surgery previously.    OBSTETRICAL HISTORY:  She is nulligravid.    GYNECOLOGIC HISTORY:  Patient reports completely irregular cycles, which she   describes excessive passage of large clots, incapacitating dysmenorrhea,   pelvic pain.  She does report stress urinary incontinence, although she does   feel this is related to the impingement of the uterine fibroids.  She has been   counseled regarding proceeding forward with management of this and upon   further consideration, has declined to proceed forward with a sling at this   time; however, should she change her mind, require future workup or even   postoperatively failed continued conservative management, we will proceed   forward with possible surgical intervention later today.    SOCIAL HISTORY:  She is .  She denies use of any alcohol.  She reports   a 25-year history of tobacco use.  She denies any other drug use.    MEDICATIONS:  Ibuprofen p.r.n.    ALLERGIES:  No known drug allergies.    PHYSICAL EXAMINATION:  VITAL SIGNS:  She is afebrile, hemodynamically stable.  HEART:  Regular rate and rhythm.  CHEST:  Clear to auscultation bilaterally.  ABDOMEN:  Soft, nondistended, nontender.  Bowel sounds are present.  PELVIC:  Shows an enlarged uterus 10-12 weeks size.  No adnexal mass or   tenderness to palpation were appreciated.  Rectovaginal exam confirmed the   above.  She is guaiac negative.  EXTREMITIES:  Nontender.    ASSESSMENT AND PLAN:  A 38-year-old woman with symptomatic uterine fibroids   with associated menometrorrhagia, dysmenorrhea, pelvic pain, symptomatic   anemia, interested in proceeding forward with surgery as  described above.    Risks, benefits and alternatives have been addressed with the patient in   detail over several visits.  She has asked appropriate questions, signed the   appropriate consents and is wishing to proceed forward with surgery as   planned.       ____________________________________     MD JOLENE FREITAS / ELICIA    DD:  05/16/2018 08:20:28  DT:  05/16/2018 10:04:43    D#:  [id number]  Job#:  [id number]

## 2018-05-29 ENCOUNTER — TELEPHONE (OUTPATIENT)
Dept: HEMATOLOGY ONCOLOGY | Facility: MEDICAL CENTER | Age: 38
End: 2018-05-29

## 2018-05-29 NOTE — TELEPHONE ENCOUNTER
I let a message for patient to return our call. Dues to scheduling conflicts with Yanique's schedule on Friday 6/1/18, patient was rescheduled to be seen at 2:00 pm. Please reschedule patient if this appointment date and time does not work for her.    I also tired patients emergency contact and was unable to reach the emergency contact as well.

## 2018-05-30 NOTE — TELEPHONE ENCOUNTER
I spoke to patient regarding her change in appointment time. Patient asked to be seen later in the day. Patient was scheduled on 6/1/18 at 4:30 pm to see ESME Michelle

## 2018-06-01 ENCOUNTER — HOSPITAL ENCOUNTER (OUTPATIENT)
Facility: MEDICAL CENTER | Age: 38
End: 2018-06-01
Attending: NURSE PRACTITIONER
Payer: MEDICAID

## 2018-06-01 ENCOUNTER — NON-PROVIDER VISIT (OUTPATIENT)
Dept: HEMATOLOGY ONCOLOGY | Facility: MEDICAL CENTER | Age: 38
End: 2018-06-01
Payer: MEDICAID

## 2018-06-01 ENCOUNTER — OFFICE VISIT (OUTPATIENT)
Dept: HEMATOLOGY ONCOLOGY | Facility: MEDICAL CENTER | Age: 38
End: 2018-06-01
Payer: MEDICAID

## 2018-06-01 VITALS
BODY MASS INDEX: 26.82 KG/M2 | HEART RATE: 60 BPM | TEMPERATURE: 97.8 F | WEIGHT: 161 LBS | OXYGEN SATURATION: 98 % | SYSTOLIC BLOOD PRESSURE: 110 MMHG | RESPIRATION RATE: 18 BRPM | DIASTOLIC BLOOD PRESSURE: 60 MMHG | HEIGHT: 65 IN

## 2018-06-01 VITALS
SYSTOLIC BLOOD PRESSURE: 110 MMHG | WEIGHT: 161.27 LBS | OXYGEN SATURATION: 98 % | RESPIRATION RATE: 18 BRPM | HEIGHT: 65 IN | BODY MASS INDEX: 26.87 KG/M2 | TEMPERATURE: 97.8 F | DIASTOLIC BLOOD PRESSURE: 60 MMHG | HEART RATE: 60 BPM

## 2018-06-01 DIAGNOSIS — R30.0 DYSURIA: ICD-10-CM

## 2018-06-01 DIAGNOSIS — D64.9 CHRONIC ANEMIA: ICD-10-CM

## 2018-06-01 DIAGNOSIS — D25.9 UTERINE LEIOMYOMA, UNSPECIFIED LOCATION: ICD-10-CM

## 2018-06-01 DIAGNOSIS — R11.0 NAUSEA: ICD-10-CM

## 2018-06-01 LAB
BASOPHILS # BLD AUTO: 1.1 % (ref 0–1.8)
BASOPHILS # BLD: 0.06 K/UL (ref 0–0.12)
EOSINOPHIL # BLD AUTO: 0.28 K/UL (ref 0–0.51)
EOSINOPHIL NFR BLD: 4.9 % (ref 0–6.9)
ERYTHROCYTE [DISTWIDTH] IN BLOOD BY AUTOMATED COUNT: 58.4 FL (ref 35.9–50)
HCT VFR BLD AUTO: 27.9 % (ref 37–47)
HGB BLD-MCNC: 8.5 G/DL (ref 12–16)
IMM GRANULOCYTES # BLD AUTO: 0.02 K/UL (ref 0–0.11)
IMM GRANULOCYTES NFR BLD AUTO: 0.4 % (ref 0–0.9)
LYMPHOCYTES # BLD AUTO: 1.79 K/UL (ref 1–4.8)
LYMPHOCYTES NFR BLD: 31.6 % (ref 22–41)
MCH RBC QN AUTO: 23.6 PG (ref 27–33)
MCHC RBC AUTO-ENTMCNC: 30.5 G/DL (ref 33.6–35)
MCV RBC AUTO: 77.5 FL (ref 81.4–97.8)
MONOCYTES # BLD AUTO: 0.4 K/UL (ref 0–0.85)
MONOCYTES NFR BLD AUTO: 7.1 % (ref 0–13.4)
NEUTROPHILS # BLD AUTO: 3.11 K/UL (ref 2–7.15)
NEUTROPHILS NFR BLD: 54.9 % (ref 44–72)
NRBC # BLD AUTO: 0 K/UL
NRBC BLD-RTO: 0 /100 WBC
PLATELET # BLD AUTO: 344 K/UL (ref 164–446)
PMV BLD AUTO: 10.3 FL (ref 9–12.9)
RBC # BLD AUTO: 3.6 M/UL (ref 4.2–5.4)
WBC # BLD AUTO: 5.7 K/UL (ref 4.8–10.8)

## 2018-06-01 PROCEDURE — 85025 COMPLETE CBC W/AUTO DIFF WBC: CPT

## 2018-06-01 PROCEDURE — 99214 OFFICE O/P EST MOD 30 MIN: CPT | Performed by: NURSE PRACTITIONER

## 2018-06-01 PROCEDURE — 36415 COLL VENOUS BLD VENIPUNCTURE: CPT | Performed by: NURSE PRACTITIONER

## 2018-06-01 RX ORDER — ONDANSETRON 4 MG/1
4 TABLET, FILM COATED ORAL EVERY 4 HOURS PRN
Qty: 30 TAB | Refills: 0 | Status: SHIPPED | OUTPATIENT
Start: 2018-06-01 | End: 2018-10-02

## 2018-06-01 ASSESSMENT — ENCOUNTER SYMPTOMS
SHORTNESS OF BREATH: 0
FLANK PAIN: 1
PALPITATIONS: 0
DIARRHEA: 0
TINGLING: 0
DIZZINESS: 0
VOMITING: 0
WHEEZING: 0
HEADACHES: 0
CONSTIPATION: 0
WEIGHT LOSS: 0
COUGH: 0
NAUSEA: 1
MYALGIAS: 0
BACK PAIN: 1
FEVER: 1

## 2018-06-01 ASSESSMENT — PAIN SCALES - GENERAL
PAINLEVEL: NO PAIN
PAINLEVEL: NO PAIN

## 2018-06-01 NOTE — PROGRESS NOTES
Subjective:      Maye Mott is a 38 y.o. female who presents for Follow-Up (2 mos, pre surgery)      HPI   Ms. Mott presents for two-month follow-up for iron deficiency anemia. She is accompanied by her spouse, Yamile.    She was unable to tolerate test dose of iron dextran on 3/20/2018. She received a unit of blood at [facility] with apparent subsequent hematuria and low back pain. She presented for Venofer as alternative iron therapy on 4/2/18 with c/o gross hematuria and low back pain. She was worked up for delayed transfusion reaction with INDRA negative but was noted to have positive antibody screen (anti-K). She  was subsequently initiated on Venofer as alternative iron therapy - but was only able to tolerate 2 of 5 doses due to nausea, shortness of breath, rash. She is not able to tolerate oral iron due to nausea and constipation. She is encouraged to eat a balanced diet focusing on iron rich foods.    Spouse reports patient has been experiencing recent fever, malaise, nausea, right flank pain, dysuria secondary to UTI. She was not able to tolerate antibiotics and was prescribed a different antibiotic - which she did not take due to anticipating being nauseated. She continues with frequency, urgency, hematuria, right flank pain, and chills.    She is scheduled to undergo hysterectomy for uterine fibroids with associated heavy periods on 6/12/18, per Dr. Napoles.        Allergies   Allergen Reactions   • Iron Swelling     Iron Dextran       Current Outpatient Prescriptions on File Prior to Visit   Medication Sig Dispense Refill   • esomeprazole (NEXIUM) 20 MG capsule Take 1 Cap by mouth every morning before breakfast. 30 Cap 0   • ondansetron (ZOFRAN) 8 MG Tab Take 1 Tab by mouth every 8 hours as needed (for nausea). 20 Tab 0   • tramadol (ULTRAM) 50 MG Tab Take 0.5 Tabs by mouth every 8 hours as needed for Severe Pain for up to 30 days. 15 Tab 0   • albuterol 108 (90 Base) MCG/ACT Aero  "Soln inhalation aerosol Inhale 2 Puffs by mouth every 6 hours as needed for Shortness of Breath. 8.5 g 2   • Cholecalciferol (VITAMIN D3) 00401 units Tab Take 1 Tab by mouth 2X A WEEK. For 3 months. 9 Tab 2     No current facility-administered medications on file prior to visit.        Review of Systems   Constitutional: Positive for chills, fever and malaise/fatigue (UTI). Negative for weight loss.   Respiratory: Negative for cough, shortness of breath and wheezing.    Cardiovascular: Negative for chest pain, palpitations and leg swelling.   Gastrointestinal: Positive for nausea (with meds). Negative for constipation, diarrhea and vomiting.   Genitourinary: Positive for flank pain (RIGHT - has not completed abx), frequency, hematuria and urgency. Negative for dysuria.        Kidney infection; abnormal uterine bleeding, fibroids, painful periods - due for hysterectomy on 6/12   Musculoskeletal: Positive for back pain. Negative for myalgias.   Neurological: Negative for dizziness, tingling and headaches.        Objective:     /60   Pulse 60   Temp 36.6 °C (97.8 °F)   Resp 18   Ht 1.651 m (5' 5\")   Wt 73.1 kg (161 lb 4.3 oz)   LMP 05/11/2018   SpO2 98%   BMI 26.84 kg/m²      Physical Exam   Constitutional: She is oriented to person, place, and time. She appears well-developed and well-nourished. No distress.   HENT:   Head: Normocephalic and atraumatic.   Mouth/Throat: Oropharynx is clear and moist. No oropharyngeal exudate.   Eyes: Conjunctivae and EOM are normal. Pupils are equal, round, and reactive to light. Right eye exhibits no discharge. Left eye exhibits no discharge. No scleral icterus.   Neck: Normal range of motion. Neck supple. No thyromegaly present.   Cardiovascular: Normal rate, regular rhythm, normal heart sounds and intact distal pulses.  Exam reveals no gallop and no friction rub.    No murmur heard.  Pulmonary/Chest: Effort normal and breath sounds normal. No respiratory distress. She " has no wheezes.   Abdominal: Soft. Bowel sounds are normal. She exhibits no distension. There is tenderness.   Musculoskeletal: Normal range of motion. She exhibits no edema or tenderness.   Low back/bilateral flank tenderness   Lymphadenopathy:        Head (right side): No submental, no submandibular, no tonsillar, no preauricular, no posterior auricular and no occipital adenopathy present.        Head (left side): No submental, no submandibular, no tonsillar, no preauricular, no posterior auricular and no occipital adenopathy present.     She has no cervical adenopathy.        Right cervical: No superficial cervical and no deep cervical adenopathy present.       Left cervical: No superficial cervical and no deep cervical adenopathy present.        Right: No supraclavicular adenopathy present.        Left: No supraclavicular adenopathy present.   Neurological: She is alert and oriented to person, place, and time.   Skin: Skin is warm and dry. No rash noted. She is not diaphoretic. No erythema. No pallor.   Psychiatric: She has a normal mood and affect. Her behavior is normal.   Anxious   Vitals reviewed.      Hospital Outpatient Visit on 06/01/2018   Component Date Value Ref Range Status   • WBC 06/01/2018 5.7  4.8 - 10.8 K/uL Final   • RBC 06/01/2018 3.60* 4.20 - 5.40 M/uL Final   • Hemoglobin 06/01/2018 8.5* 12.0 - 16.0 g/dL Final   • Hematocrit 06/01/2018 27.9* 37.0 - 47.0 % Final   • MCV 06/01/2018 77.5* 81.4 - 97.8 fL Final   • MCH 06/01/2018 23.6* 27.0 - 33.0 pg Final   • MCHC 06/01/2018 30.5* 33.6 - 35.0 g/dL Final   • RDW 06/01/2018 58.4* 35.9 - 50.0 fL Final   • Platelet Count 06/01/2018 344  164 - 446 K/uL Final   • MPV 06/01/2018 10.3  9.0 - 12.9 fL Final   • Neutrophils-Polys 06/01/2018 54.90  44.00 - 72.00 % Final   • Lymphocytes 06/01/2018 31.60  22.00 - 41.00 % Final   • Monocytes 06/01/2018 7.10  0.00 - 13.40 % Final   • Eosinophils 06/01/2018 4.90  0.00 - 6.90 % Final   • Basophils 06/01/2018 1.10   0.00 - 1.80 % Final   • Immature Granulocytes 06/01/2018 0.40  0.00 - 0.90 % Final   • Nucleated RBC 06/01/2018 0.00  /100 WBC Final   • Neutrophils (Absolute) 06/01/2018 3.11  2.00 - 7.15 K/uL Final    Includes immature neutrophils, if present.   • Lymphs (Absolute) 06/01/2018 1.79  1.00 - 4.80 K/uL Final   • Monos (Absolute) 06/01/2018 0.40  0.00 - 0.85 K/uL Final   • Eos (Absolute) 06/01/2018 0.28  0.00 - 0.51 K/uL Final   • Baso (Absolute) 06/01/2018 0.06  0.00 - 0.12 K/uL Final   • Immature Granulocytes (abs) 06/01/2018 0.02  0.00 - 0.11 K/uL Final   • NRBC (Absolute) 06/01/2018 0.00  K/uL Final          Assessment/Plan:     1. Chronic anemia  CBC WITH DIFFERENTIAL   2. Nausea  ondansetron (ZOFRAN) 4 MG Tab tablet   3. Uterine leiomyoma, unspecified location     4. Dysuria         1. Dysuria/nausea: Patient has recently been diagnosed with UTI for which she has not completed antibiotics due to associated nausea. She is provided Zofran to help facilitate compliance with antibiotics. She is scheduled to undergo surgery on 6/12/2018 and is advised to inform surgeon of ongoing symptoms and issues.    2.  Uterine fibroids: Patient reports uterine fibroids and associated heavy bleeding during cycles, largest fibroid is apparently 10 cm. She is scheduled to undergo hysterectomy with Dr. Napoles on 6/12/18. Hgb 8.5, discussed with Dr. Witt, results is actually fairly good for the patient and he did not advise transfusion prior to procedure, at this time. Contacted Dr. Napoles's MA, Cherelle, to advise of Dr. Witt's recommendation: we will not be ordering transfusion prior to procedure and will defer any desired transfusion to surgeon based on his preferences.    3.  Chronic iron deficiency anemia: Hemoglobin is presently 8.5 with patient asymptomatic in regards to anemia. She is not able to tolerate iron dextran IV, Venofer IV, or oral iron supplement.    Given that she is not able to tolerate recommended therapies  she is advised to continue with good hydration and balanced diet and follow up with Dr. Witt as needed.              The patient verbalized agreement and understanding of current plan. All questions and concerns were addressed at time of visit.    Please note that this dictation was created using voice recognition software. I have made every reasonable attempt to correct obvious errors, but I expect that there are errors of grammar and possibly content that I did not discover before finalizing the note.

## 2018-06-02 NOTE — PROGRESS NOTES
Maye Mott is a 38 y.o. female here for a non-provider visit for: Lab Draws  on 6/1/2018 at 5:06 PM    Procedure Performed: Venipuncture     Anatomical site: Right Antecubital Area (AC)    Equipment used: 21g Butterfly     Labs drawn: CBC w/diff    Ordering Provider: ESME Michelle By: Paola Mesa, [facility]    Without incident.

## 2018-06-06 ASSESSMENT — ENCOUNTER SYMPTOMS: CHILLS: 1

## 2018-06-11 DIAGNOSIS — Z01.812 PRE-OPERATIVE LABORATORY EXAMINATION: ICD-10-CM

## 2018-06-11 LAB
ANION GAP SERPL CALC-SCNC: 8 MMOL/L (ref 0–11.9)
BUN SERPL-MCNC: 17 MG/DL (ref 8–22)
CALCIUM SERPL-MCNC: 9.3 MG/DL (ref 8.5–10.5)
CHLORIDE SERPL-SCNC: 108 MMOL/L (ref 96–112)
CO2 SERPL-SCNC: 25 MMOL/L (ref 20–33)
CREAT SERPL-MCNC: 0.88 MG/DL (ref 0.5–1.4)
ERYTHROCYTE [DISTWIDTH] IN BLOOD BY AUTOMATED COUNT: 54.4 FL (ref 35.9–50)
GLUCOSE SERPL-MCNC: 110 MG/DL (ref 65–99)
HCG SERPL QL: NEGATIVE
HCT VFR BLD AUTO: 30.3 % (ref 37–47)
HGB BLD-MCNC: 9.1 G/DL (ref 12–16)
MCH RBC QN AUTO: 23.5 PG (ref 27–33)
MCHC RBC AUTO-ENTMCNC: 30 G/DL (ref 33.6–35)
MCV RBC AUTO: 78.1 FL (ref 81.4–97.8)
PLATELET # BLD AUTO: 357 K/UL (ref 164–446)
PMV BLD AUTO: 10.9 FL (ref 9–12.9)
POTASSIUM SERPL-SCNC: 3.8 MMOL/L (ref 3.6–5.5)
RBC # BLD AUTO: 3.88 M/UL (ref 4.2–5.4)
SODIUM SERPL-SCNC: 141 MMOL/L (ref 135–145)
WBC # BLD AUTO: 6.2 K/UL (ref 4.8–10.8)

## 2018-06-11 PROCEDURE — 36415 COLL VENOUS BLD VENIPUNCTURE: CPT

## 2018-06-11 PROCEDURE — 84703 CHORIONIC GONADOTROPIN ASSAY: CPT

## 2018-06-11 PROCEDURE — 80048 BASIC METABOLIC PNL TOTAL CA: CPT

## 2018-06-11 PROCEDURE — 85027 COMPLETE CBC AUTOMATED: CPT

## 2018-06-11 RX ORDER — ACETAMINOPHEN 500 MG
500-1000 TABLET ORAL EVERY 6 HOURS PRN
COMMUNITY
End: 2018-10-02

## 2018-06-11 RX ORDER — IBUPROFEN 800 MG/1
800 TABLET ORAL EVERY 8 HOURS PRN
COMMUNITY

## 2018-06-12 ENCOUNTER — HOSPITAL ENCOUNTER (INPATIENT)
Facility: MEDICAL CENTER | Age: 38
LOS: 1 days | DRG: 743 | End: 2018-06-12
Attending: SPECIALIST | Admitting: SPECIALIST
Payer: MEDICAID

## 2018-06-12 VITALS
DIASTOLIC BLOOD PRESSURE: 46 MMHG | HEIGHT: 65 IN | TEMPERATURE: 97.7 F | SYSTOLIC BLOOD PRESSURE: 102 MMHG | BODY MASS INDEX: 26.19 KG/M2 | RESPIRATION RATE: 16 BRPM | OXYGEN SATURATION: 99 % | HEART RATE: 69 BPM | WEIGHT: 157.19 LBS

## 2018-06-12 PROCEDURE — 501838 HCHG SUTURE GENERAL: Performed by: SPECIALIST

## 2018-06-12 PROCEDURE — 160036 HCHG PACU - EA ADDL 30 MINS PHASE I: Performed by: SPECIALIST

## 2018-06-12 PROCEDURE — 160048 HCHG OR STATISTICAL LEVEL 1-5: Performed by: SPECIALIST

## 2018-06-12 PROCEDURE — 500854 HCHG NEEDLE, INSUFFLATION FOR STEP: Performed by: SPECIALIST

## 2018-06-12 PROCEDURE — 502703 HCHG DEVICE, LIGASURE V SEALER: Performed by: SPECIALIST

## 2018-06-12 PROCEDURE — 306637 HCHG MISC ORTHO ITEM RC 0274

## 2018-06-12 PROCEDURE — 700111 HCHG RX REV CODE 636 W/ 250 OVERRIDE (IP)

## 2018-06-12 PROCEDURE — 700102 HCHG RX REV CODE 250 W/ 637 OVERRIDE(OP)

## 2018-06-12 PROCEDURE — 0T5B4ZZ DESTRUCTION OF BLADDER, PERCUTANEOUS ENDOSCOPIC APPROACH: ICD-10-PCS | Performed by: SPECIALIST

## 2018-06-12 PROCEDURE — 88307 TISSUE EXAM BY PATHOLOGIST: CPT

## 2018-06-12 PROCEDURE — 700101 HCHG RX REV CODE 250

## 2018-06-12 PROCEDURE — 160041 HCHG SURGERY MINUTES - EA ADDL 1 MIN LEVEL 4: Performed by: SPECIALIST

## 2018-06-12 PROCEDURE — 501577 HCHG TROCAR, STEP 11MM: Performed by: SPECIALIST

## 2018-06-12 PROCEDURE — 160002 HCHG RECOVERY MINUTES (STAT): Performed by: SPECIALIST

## 2018-06-12 PROCEDURE — 0UT9FZZ RESECTION OF UTERUS, VIA NATURAL OR ARTIFICIAL OPENING WITH PERCUTANEOUS ENDOSCOPIC ASSISTANCE: ICD-10-PCS | Performed by: SPECIALIST

## 2018-06-12 PROCEDURE — 160035 HCHG PACU - 1ST 60 MINS PHASE I: Performed by: SPECIALIST

## 2018-06-12 PROCEDURE — 160029 HCHG SURGERY MINUTES - 1ST 30 MINS LEVEL 4: Performed by: SPECIALIST

## 2018-06-12 PROCEDURE — 502240 HCHG MISC OR SUPPLY RC 0272: Performed by: SPECIALIST

## 2018-06-12 PROCEDURE — 160009 HCHG ANES TIME/MIN: Performed by: SPECIALIST

## 2018-06-12 PROCEDURE — 500886 HCHG PACK, LAPAROSCOPY: Performed by: SPECIALIST

## 2018-06-12 PROCEDURE — A9270 NON-COVERED ITEM OR SERVICE: HCPCS

## 2018-06-12 PROCEDURE — A4338 INDWELLING CATHETER LATEX: HCPCS | Performed by: SPECIALIST

## 2018-06-12 PROCEDURE — 501579 HCHG TROCAR, STEP 5MM: Performed by: SPECIALIST

## 2018-06-12 PROCEDURE — 0UT7FZZ RESECTION OF BILATERAL FALLOPIAN TUBES, VIA NATURAL OR ARTIFICIAL OPENING WITH PERCUTANEOUS ENDOSCOPIC ASSISTANCE: ICD-10-PCS | Performed by: SPECIALIST

## 2018-06-12 RX ORDER — ONDANSETRON 2 MG/ML
4 INJECTION INTRAMUSCULAR; INTRAVENOUS EVERY 6 HOURS PRN
Status: DISCONTINUED | OUTPATIENT
Start: 2018-06-12 | End: 2018-06-12 | Stop reason: HOSPADM

## 2018-06-12 RX ORDER — ONDANSETRON 2 MG/ML
INJECTION INTRAMUSCULAR; INTRAVENOUS
Status: COMPLETED
Start: 2018-06-12 | End: 2018-06-12

## 2018-06-12 RX ORDER — SIMETHICONE 80 MG
80 TABLET,CHEWABLE ORAL EVERY 8 HOURS PRN
Status: DISCONTINUED | OUTPATIENT
Start: 2018-06-12 | End: 2018-06-12 | Stop reason: HOSPADM

## 2018-06-12 RX ORDER — OXYCODONE HYDROCHLORIDE 5 MG/1
10 TABLET ORAL
Status: DISCONTINUED | OUTPATIENT
Start: 2018-06-12 | End: 2018-06-12 | Stop reason: HOSPADM

## 2018-06-12 RX ORDER — BUPIVACAINE HYDROCHLORIDE AND EPINEPHRINE 2.5; 5 MG/ML; UG/ML
INJECTION, SOLUTION EPIDURAL; INFILTRATION; INTRACAUDAL; PERINEURAL
Status: DISCONTINUED | OUTPATIENT
Start: 2018-06-12 | End: 2018-06-12 | Stop reason: HOSPADM

## 2018-06-12 RX ORDER — OXYCODONE HCL 5 MG/5 ML
SOLUTION, ORAL ORAL
Status: COMPLETED
Start: 2018-06-12 | End: 2018-06-12

## 2018-06-12 RX ORDER — ACETAMINOPHEN 500 MG
1000 TABLET ORAL EVERY 6 HOURS
Status: DISCONTINUED | OUTPATIENT
Start: 2018-06-12 | End: 2018-06-12 | Stop reason: HOSPADM

## 2018-06-12 RX ORDER — SODIUM CHLORIDE, SODIUM LACTATE, POTASSIUM CHLORIDE, CALCIUM CHLORIDE 600; 310; 30; 20 MG/100ML; MG/100ML; MG/100ML; MG/100ML
INJECTION, SOLUTION INTRAVENOUS CONTINUOUS
Status: DISCONTINUED | OUTPATIENT
Start: 2018-06-12 | End: 2018-06-12 | Stop reason: HOSPADM

## 2018-06-12 RX ORDER — BUPIVACAINE HYDROCHLORIDE 2.5 MG/ML
INJECTION, SOLUTION EPIDURAL; INFILTRATION; INTRACAUDAL
Status: DISCONTINUED
Start: 2018-06-12 | End: 2018-06-12 | Stop reason: HOSPADM

## 2018-06-12 RX ORDER — METOCLOPRAMIDE HYDROCHLORIDE 5 MG/ML
10 INJECTION INTRAMUSCULAR; INTRAVENOUS EVERY 4 HOURS PRN
Status: DISCONTINUED | OUTPATIENT
Start: 2018-06-12 | End: 2018-06-12 | Stop reason: HOSPADM

## 2018-06-12 RX ADMIN — ONDANSETRON 2 MG: 2 INJECTION INTRAMUSCULAR; INTRAVENOUS at 14:45

## 2018-06-12 RX ADMIN — OXYCODONE HYDROCHLORIDE 5 MG: 5 SOLUTION ORAL at 15:30

## 2018-06-12 RX ADMIN — SODIUM CHLORIDE, SODIUM LACTATE, POTASSIUM CHLORIDE, CALCIUM CHLORIDE: 600; 310; 30; 20 INJECTION, SOLUTION INTRAVENOUS at 09:45

## 2018-06-12 ASSESSMENT — PAIN SCALES - GENERAL
PAINLEVEL_OUTOF10: 8
PAINLEVEL_OUTOF10: 0
PAINLEVEL_OUTOF10: 4
PAINLEVEL_OUTOF10: 0

## 2018-06-12 NOTE — OR NURSING
Pt to PACU from OR. VSS. Respirations even and unlabored. Declines pain/nausea at this time. Pt asking to get up, encouraged pt to stay in bed. Brought family to room.

## 2018-06-12 NOTE — OP REPORT
DATE OF SERVICE:  6/12/2018     PREOPERATIVE DIAGNOSES:  Symptomatic uterine fibroids, associated   menometrorrhagia, dysmenorrhea, pelvic pain, and symptomatic anemia     POSTOPERATIVE DIAGNOSES:  Same; pelvic endometriosis     FINAL PATHOLOGY:  Pending.     PROCEDURES PERFORMED:  Procedure(s):  VAGINAL HYSTERECTOMY SCOPE TOTAL, fulgaration of endometreosis - Wound Class: Clean Contaminated  SALPINGECTOMY - Wound Class: Clean Contaminated  FULGURATION OF ENDOMETRIOSIS - Wound Class: Clean Contaminated     SURGEON:  Steven Napoles MD.     ASSISTANT:  John Lundberg MD.     ANESTHESIA:  General     ANESTHESIOLOGIST:  Anesthesiologist: Irving Adhikari M.D.     ESTIMATED BLOOD LOSS FOR THE PROCEDURE:  100 mL.     FINDINGS:  Large fibroid uterus weighing 774 gms with normal appearing adnexa and pelvis with the exception of one endometriosis implant on the anterior peritoneal surface just lateral to the midline on the right which was fulgurated under direct laparoscopic visualization      DESCRIPTION OF PROCEDURE:  The patient was taken to the operating room, where  general anesthesia was performed without difficulty.  The patient was then    prepped and draped in usual sterile fashion with lower extremities placed in    Jeb stirrups.  Attention was first turned to the perineum, where a weighted    speculum was placed with the use of Hilton retractor.  Single-toothed tenaculum    was placed on the anterior lip of the cervix.  Hulka uterine manipulator was    then placed.  Single-toothed tenaculum and retractors were then removed.     Attention was then turned to the umbilicus, where a 1 cm incision was made.     Veress needle was placed, 3.5 L of carboperitoneum were then obtained.  A 10    mm trocar port was then placed.  Two separate ports were placed approximately    one-third of the way from the anterior-superior iliac spine lateral to the    rectus muscle under direct laparoscopic visualization with 5 mm ports  placed.     Attention was then turned to the perineum, where a weighted speculum was    placed with the use of Hilton retractors.  Single-toothed tenaculum was placed    on the anterior lip of the cervix.  Hulka uterine manipulator was then placed.    Single-toothed tenaculum and retractor was then removed.  Attention was then   turned to the umbilicus, where a 1 cm incision was made.  A Veress needle was   placed, 3.5 L of carboperitoneum were then obtained.  A 10 mm trocar port was   then placed.  Two separate ports were placed approximately one-third of the    way from the anterior-superior iliac spine lateral to the rectus muscle under    direct laparoscopic visualization.  Attention was then turned to the distal    portion of the fallopian tubes, which were grasped just below the fallopian    tube above the ovary.  This area was grasped, cauterized, and transected on    each side.  The mesosalpinx underneath the fallopian tube was then grasped,    cauterized, and transected all the way to the level of the uterus, freeing up    the fallopian tube on each side.  The uteroovarian, broad, and round ligaments   were individually isolated, cauterized, and transected.  The vesicouterine    peritoneum was grasped, incised, and the bladder flap was created.  Uterine    vessels were then clamped, cauterized, and transected  on each side.  The    vesicouterine peritoneum was then completed.  Attention was then turned to the   perineum, where a weighted speculum was placed with the use of Hilton    retractor.  A thyroid tenaculum was placed on the anterior lip, one on the    posterior lips of the cervix.  Cervix was then injected with 10 mL of 0.25%    Marcaine with epinephrine.  Incision was made starting anteriorly, proceeding    posterior, proceeding back anterior once again.  With the use of Bovie    electrocautery, after injecting 10 mL of 0.25% Marcaine with epinephrine, the    anterior cul-de-sac was entered sharply.   Right angle Katja retractor was    placed upon sharp entry in the anterior cul-de-sac.  Large duck billed    speculum was placed upon sharp entry in the posterior cul-de-sac.  Uterosacral   cardinal complexes were then grasped with ZED clamps, transected, and suture    ligated with 0 Vicryl suture bilaterally. The uterus was then taken out in a piece meal fashion with morcellation given the size of the uterus. Attention was then turned to the    perineum, where the uterus and both fallopian tubes were then handed off the    field as specimen.  Excellent hemostasis noted at all vascular pedicles.  The    anterior and posterior leaf of the peritoneum in addition to the uterosacral    cardinal complexes were reapproximated in the midline with a pursestring    suture using 2-0 Vicryl.  The vaginal cuff was then reapproximated with    figure-of-eight sutures using 0 Vicryl reapproximating both uterosacral    cardinal complex and respective vaginal apices.  Attention was then turned back to the abdomen and pelvis. The endometriosis lesion on the anterior surface of the bladder was fulgurated under direct laparoscopic visualization. Pelvis was inspected and noted to be hemostatic, 3.5 L of carboperitoneum removed followed by removal of the ports under direct laparoscopic visualization.  The incisions were then reapproximated with single interrupted sutures using 4-0 Vicryl, injected with 20 mL of 0.25% Marcaine with epinephrine.  The patient    tolerated the procedure well and was awoken from general anesthesia, was taken   to the recovery room in stable condition.        ____________________________________     HEAVEN SINCLAIR MD

## 2018-06-12 NOTE — OR SURGEON
Immediate Post OP Note    PreOp Diagnosis: Symptomatic uterine fibroids, associated   menometrorrhagia, dysmenorrhea, pelvic pain, and symptomatic anemia    PostOp Diagnosis: Same; final pathology pending    Procedure(s):  VAGINAL HYSTERECTOMY SCOPE TOTAL, fulgaration of endometreosis - Wound Class: Clean Contaminated  SALPINGECTOMY - Wound Class: Clean Contaminated    Surgeon(s):  FLETCHER Boggs M.D.    Anesthesiologist/Type of Anesthesia:  Anesthesiologist: Irving Adhikari M.D./General    Surgical Staff:  Circulator: Melissa Kamara RRAYNA  Relief Circulator: Marie Garcia, R.N.; Navarro San RReubenN.; Missy Norwood RReubenNReuben  Scrub Person: Petrona Frye    Specimens removed if any:  Uterus bilateral fallopian tubes    Estimated Blood Loss: 100ccs    Findings: Large fibroid uterus weighing 774 gms with normal appearing adnexa and pelvis with the exception of one endometriosis implant on the anterior peritoneal surface just lateral to the midline on the right which was fulgurated under direct laparoscopic visualization    Complications: None        6/12/2018 2:08 PM Steven Napoles M.D.

## 2018-06-12 NOTE — OR NURSING
Removed reich per MD order. Discussed plan with pt to dc after first void. Declines pain. Casi pad w minimal blood.

## 2018-06-12 NOTE — OR NURSING
Discharge orders received. All lines and monitors discontinued. Pt c/o tolerable gas pain, no nausea at this time. Reviewed discharge paperwork with pt and wife. Discussed diet, activity, medications, follow up care and worsening symptoms. No questions at this time. Pt to be discharged to home via private vehicle. Ambulated out of department.

## 2018-06-12 NOTE — DISCHARGE INSTRUCTIONS
ACTIVITY: Rest and take it easy for the first 24 hours.  A responsible adult is recommended to remain with you during that time.  It is normal to feel sleepy.  We encourage you to not do anything that requires balance, judgment or coordination.    MILD FLU-LIKE SYMPTOMS ARE NORMAL. YOU MAY EXPERIENCE GENERALIZED MUSCLE ACHES, THROAT IRRITATION, HEADACHE AND/OR SOME NAUSEA.    FOR 24 HOURS DO NOT:  Drive, operate machinery or run household appliances.  Drink beer or alcoholic beverages.   Make important decisions or sign legal documents.    SPECIAL INSTRUCTIONS: See handout    DIET: To avoid nausea, slowly advance diet as tolerated, avoiding spicy or greasy foods for the first day.  Add more substantial food to your diet according to your physician's instructions.  Babies can be fed formula or breast milk as soon as they are hungry.  INCREASE FLUIDS AND FIBER TO AVOID CONSTIPATION.    FOLLOW-UP APPOINTMENT:  A follow-up appointment should be arranged with your doctor in 2 weeks; call to schedule.    You should CALL YOUR PHYSICIAN if you develop:  Fever greater than 101 degrees F.  Pain not relieved by medication, or persistent nausea or vomiting.  Excessive bleeding (blood soaking through dressing) or unexpected drainage from the wound.  Extreme redness or swelling around the incision site, drainage of pus or foul smelling drainage.  Inability to urinate or empty your bladder within 8 hours.  Problems with breathing or chest pain.    You should call 911 if you develop problems with breathing or chest pain.  If you are unable to contact your doctor or surgical center, you should go to the nearest emergency room or urgent care center.  Physician's telephone #: Dr. Napoles [phone]    If any questions arise, call your doctor.  If your doctor is not available, please feel free to call the Surgical Center at [phone].  The Center is open Monday through Friday from 7AM to 7PM.  You can also call the HEALTH  HOTLINE open 24 hours/day, 7 days/week and speak to a nurse at [phone], or toll free at [phone].    A registered nurse may call you a few days after your surgery to see how you are doing after your procedure.    MEDICATIONS: Resume taking daily medication.  Take prescribed pain medication with food.  If no medication is prescribed, you may take non-aspirin pain medication if needed.  PAIN MEDICATION CAN BE VERY CONSTIPATING.  Take a stool softener or laxative such as senokot, pericolace, or milk of magnesia if needed.    Prescription at home.  Last pain medication given at 3:19 pm.    If your physician has prescribed pain medication that includes Acetaminophen (Tylenol), do not take additional Acetaminophen (Tylenol) while taking the prescribed medication.    Depression / Suicide Risk    As you are discharged from this [facility] facility, it is important to learn how to keep safe from harming yourself.    Recognize the warning signs:  · Abrupt changes in personality, positive or negative- including increase in energy   · Giving away possessions  · Change in eating patterns- significant weight changes-  positive or negative  · Change in sleeping patterns- unable to sleep or sleeping all the time   · Unwillingness or inability to communicate  · Depression  · Unusual sadness, discouragement and loneliness  · Talk of wanting to die  · Neglect of personal appearance   · Rebelliousness- reckless behavior  · Withdrawal from people/activities they love  · Confusion- inability to concentrate     If you or a loved one observes any of these behaviors or has concerns about self-harm, here's what you can do:  · Talk about it- your feelings and reasons for harming yourself  · Remove any means that you might use to hurt yourself (examples: pills, rope, extension cords, firearm)  · Get professional help from the community (Mental Health, Substance Abuse, psychological counseling)  · Do not be alone:Call your Safe  Contact- someone whom you trust who will be there for you.  · Call your local CRISIS HOTLINE 957-2126 or 816-266-8682  · Call your local Children's Mobile Crisis Response Team [place] (700) 436-8732 or www.StayTuned  · Call the toll free National Suicide Prevention Hotlines   · National Suicide Prevention Lifeline 710-773-HAIQ (6255)  · National [employer] Line Network 800-SUICIDE (641-0124)

## 2018-06-12 NOTE — OR NURSING
Ambulated pt to bathroom. Steady gait. Anxious to void and go home. Voided small amount. Minimal blood noted in toilet. Back to PACU in recliner.

## 2018-06-18 NOTE — ADDENDUM NOTE
Encounter addended by: Vickie Chaney R.N. on: 6/18/2018  7:47 AM<BR>    Actions taken: Flowsheet accepted

## 2018-08-01 ENCOUNTER — TELEPHONE (OUTPATIENT)
Dept: HEMATOLOGY ONCOLOGY | Facility: MEDICAL CENTER | Age: 38
End: 2018-08-01

## 2018-10-02 PROBLEM — Z87.19 HISTORY OF GI BLEED: Status: RESOLVED | Noted: 2018-02-28 | Resolved: 2018-10-02

## 2018-10-02 PROBLEM — D25.9 UTERINE LEIOMYOMA: Status: RESOLVED | Noted: 2018-02-28 | Resolved: 2018-10-02

## 2018-10-02 PROBLEM — Z87.11 HISTORY OF BLEEDING ULCERS: Status: ACTIVE | Noted: 2018-10-02

## 2018-10-02 PROBLEM — R00.2 INTERMITTENT PALPITATIONS: Status: ACTIVE | Noted: 2018-10-02

## 2018-10-02 PROBLEM — G54.0 THORACIC OUTLET SYNDROME: Status: ACTIVE | Noted: 2018-10-02

## 2018-11-15 ENCOUNTER — TELEPHONE (OUTPATIENT)
Dept: HEMATOLOGY ONCOLOGY | Facility: MEDICAL CENTER | Age: 38
End: 2018-11-15

## 2018-11-16 NOTE — TELEPHONE ENCOUNTER
2nd attempt    Left message for patient to return call to establish care with Dr. Delarosa.     Please schedule patient when she returns the call.     Hematology transfer from Dr. Witt.

## 2019-01-03 ENCOUNTER — TELEPHONE (OUTPATIENT)
Dept: CARDIOLOGY | Facility: MEDICAL CENTER | Age: 39
End: 2019-01-03

## 2019-04-03 ENCOUNTER — TELEPHONE (OUTPATIENT)
Dept: HEMATOLOGY ONCOLOGY | Facility: MEDICAL CENTER | Age: 39
End: 2019-04-03

## 2019-04-03 NOTE — TELEPHONE ENCOUNTER
I called left message for the patient to return my call. regarding her miss appointment on  12/12/18. When she calls please schedule her with Dr Delarosa

## 2019-04-03 NOTE — LETTER
May 14, 2019        Maye Mott    [address] [place] [address]    After several attempts, we have been unable to reach you by phone to schedule your follow up appointment with an Oncologist. We ask that you contact us at [phone] to schedule your appointment. Our scheduling office hours are from 8-5 Monday thru Friday. It is very important to make this appointment so the Oncologist can treat your medical condition effectively.    Kind Regards,        Blaire Heredia, Med Ass't

## 2019-04-29 NOTE — TELEPHONE ENCOUNTER
2 nd attempt     I called left message for the patient to return my call. regarding her miss appointment on  12/12/18. When she calls please schedule her with Dr Delarosa  or Dr Murray

## 2019-05-14 NOTE — TELEPHONE ENCOUNTER
3rd attempt     I called left message for the patient to return my call. regarding her miss appointment on  12/12/18. When she calls please schedule her with Dr Delarosa
